# Patient Record
Sex: MALE
[De-identification: names, ages, dates, MRNs, and addresses within clinical notes are randomized per-mention and may not be internally consistent; named-entity substitution may affect disease eponyms.]

---

## 2017-04-19 PROBLEM — Z00.00 ENCOUNTER FOR PREVENTIVE HEALTH EXAMINATION: Status: ACTIVE | Noted: 2017-04-19

## 2017-04-20 ENCOUNTER — APPOINTMENT (OUTPATIENT)
Dept: SURGICAL ONCOLOGY | Facility: CLINIC | Age: 52
End: 2017-04-20

## 2017-04-20 VITALS
HEART RATE: 63 BPM | SYSTOLIC BLOOD PRESSURE: 114 MMHG | HEIGHT: 68 IN | RESPIRATION RATE: 13 BRPM | BODY MASS INDEX: 29.27 KG/M2 | TEMPERATURE: 98.1 F | OXYGEN SATURATION: 97 % | WEIGHT: 193.12 LBS | DIASTOLIC BLOOD PRESSURE: 79 MMHG

## 2017-04-20 DIAGNOSIS — Z97.3 PRESENCE OF SPECTACLES AND CONTACT LENSES: ICD-10-CM

## 2017-04-20 DIAGNOSIS — E78.00 PURE HYPERCHOLESTEROLEMIA, UNSPECIFIED: ICD-10-CM

## 2017-04-20 DIAGNOSIS — Z78.9 OTHER SPECIFIED HEALTH STATUS: ICD-10-CM

## 2017-04-20 DIAGNOSIS — I10 ESSENTIAL (PRIMARY) HYPERTENSION: ICD-10-CM

## 2017-04-20 DIAGNOSIS — Z82.49 FAMILY HISTORY OF ISCHEMIC HEART DISEASE AND OTHER DISEASES OF THE CIRCULATORY SYSTEM: ICD-10-CM

## 2017-04-20 RX ORDER — ATORVASTATIN CALCIUM 20 MG/1
20 TABLET, FILM COATED ORAL
Refills: 0 | Status: ACTIVE | COMMUNITY

## 2017-04-20 RX ORDER — VALACYCLOVIR 1 G/1
1 TABLET, FILM COATED ORAL
Refills: 0 | Status: ACTIVE | COMMUNITY

## 2017-05-09 ENCOUNTER — APPOINTMENT (OUTPATIENT)
Dept: OTOLARYNGOLOGY | Facility: CLINIC | Age: 52
End: 2017-05-09

## 2017-05-09 VITALS — HEART RATE: 72 BPM | SYSTOLIC BLOOD PRESSURE: 143 MMHG | DIASTOLIC BLOOD PRESSURE: 91 MMHG | RESPIRATION RATE: 16 BRPM

## 2017-05-09 DIAGNOSIS — C77.0 SECONDARY AND UNSPECIFIED MALIGNANT NEOPLASM OF LYMPH NODES OF HEAD, FACE AND NECK: ICD-10-CM

## 2017-05-16 ENCOUNTER — OUTPATIENT (OUTPATIENT)
Dept: OUTPATIENT SERVICES | Facility: HOSPITAL | Age: 52
LOS: 1 days | End: 2017-05-16
Payer: COMMERCIAL

## 2017-05-16 VITALS
DIASTOLIC BLOOD PRESSURE: 86 MMHG | WEIGHT: 190.92 LBS | HEIGHT: 68 IN | TEMPERATURE: 97 F | HEART RATE: 64 BPM | SYSTOLIC BLOOD PRESSURE: 130 MMHG | RESPIRATION RATE: 16 BRPM | OXYGEN SATURATION: 97 %

## 2017-05-16 DIAGNOSIS — C01 MALIGNANT NEOPLASM OF BASE OF TONGUE: ICD-10-CM

## 2017-05-16 DIAGNOSIS — G47.33 OBSTRUCTIVE SLEEP APNEA (ADULT) (PEDIATRIC): ICD-10-CM

## 2017-05-16 DIAGNOSIS — Z90.89 ACQUIRED ABSENCE OF OTHER ORGANS: Chronic | ICD-10-CM

## 2017-05-16 DIAGNOSIS — I10 ESSENTIAL (PRIMARY) HYPERTENSION: ICD-10-CM

## 2017-05-16 DIAGNOSIS — Z98.890 OTHER SPECIFIED POSTPROCEDURAL STATES: Chronic | ICD-10-CM

## 2017-05-16 LAB
BUN SERPL-MCNC: 12 MG/DL — SIGNIFICANT CHANGE UP (ref 7–23)
CALCIUM SERPL-MCNC: 9.8 MG/DL — SIGNIFICANT CHANGE UP (ref 8.4–10.5)
CHLORIDE SERPL-SCNC: 100 MMOL/L — SIGNIFICANT CHANGE UP (ref 98–107)
CO2 SERPL-SCNC: 29 MMOL/L — SIGNIFICANT CHANGE UP (ref 22–31)
CREAT SERPL-MCNC: 0.99 MG/DL — SIGNIFICANT CHANGE UP (ref 0.5–1.3)
GLUCOSE SERPL-MCNC: 128 MG/DL — HIGH (ref 70–99)
HCT VFR BLD CALC: 42.4 % — SIGNIFICANT CHANGE UP (ref 39–50)
HGB BLD-MCNC: 14.3 G/DL — SIGNIFICANT CHANGE UP (ref 13–17)
MCHC RBC-ENTMCNC: 29.8 PG — SIGNIFICANT CHANGE UP (ref 27–34)
MCHC RBC-ENTMCNC: 33.7 % — SIGNIFICANT CHANGE UP (ref 32–36)
MCV RBC AUTO: 88.3 FL — SIGNIFICANT CHANGE UP (ref 80–100)
PLATELET # BLD AUTO: 409 K/UL — HIGH (ref 150–400)
PMV BLD: 9.3 FL — SIGNIFICANT CHANGE UP (ref 7–13)
POTASSIUM SERPL-MCNC: 4.1 MMOL/L — SIGNIFICANT CHANGE UP (ref 3.5–5.3)
POTASSIUM SERPL-SCNC: 4.1 MMOL/L — SIGNIFICANT CHANGE UP (ref 3.5–5.3)
RBC # BLD: 4.8 M/UL — SIGNIFICANT CHANGE UP (ref 4.2–5.8)
RBC # FLD: 12.3 % — SIGNIFICANT CHANGE UP (ref 10.3–14.5)
SODIUM SERPL-SCNC: 141 MMOL/L — SIGNIFICANT CHANGE UP (ref 135–145)
WBC # BLD: 9.14 K/UL — SIGNIFICANT CHANGE UP (ref 3.8–10.5)
WBC # FLD AUTO: 9.14 K/UL — SIGNIFICANT CHANGE UP (ref 3.8–10.5)

## 2017-05-16 PROCEDURE — 93010 ELECTROCARDIOGRAM REPORT: CPT

## 2017-05-16 RX ORDER — SODIUM CHLORIDE 9 MG/ML
1000 INJECTION, SOLUTION INTRAVENOUS
Qty: 0 | Refills: 0 | Status: DISCONTINUED | OUTPATIENT
Start: 2017-05-18 | End: 2017-06-02

## 2017-05-16 NOTE — H&P PST ADULT - PROBLEM SELECTOR PLAN 2
EKG done in PST. Instructed to take metoprolol, lisinopril AM of surgery with a sip of water. Patient states, he's evaluated by Dr. Carlos Montero (cardiologist) for clearance. Will obtain for PST chart.

## 2017-05-16 NOTE — H&P PST ADULT - NEGATIVE ENMT SYMPTOMS
no post-nasal discharge/no sinus symptoms/no nasal obstruction/no nasal discharge/no hearing difficulty/no nasal congestion/no vertigo/no tinnitus/no ear pain no nasal obstruction/no ear pain/no tinnitus/no hearing difficulty/no vertigo/no nasal congestion/no post-nasal discharge/no dry mouth/no nasal discharge/no sinus symptoms/no dysphagia

## 2017-05-16 NOTE — H&P PST ADULT - NSANTHOSAYNRD_GEN_A_CORE
No. LEEANNE screening performed.  STOP BANG Legend: 0-2 = LOW Risk; 3-4 = INTERMEDIATE Risk; 5-8 = HIGH Risk

## 2017-05-16 NOTE — H&P PST ADULT - NEGATIVE MUSCULOSKELETAL SYMPTOMS
no muscle cramps/no stiffness/no back pain/no neck pain/no muscle weakness no joint swelling/no back pain/no muscle weakness/no stiffness/no muscle cramps/no neck pain

## 2017-05-16 NOTE — H&P PST ADULT - ENMT COMMENTS
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

## 2017-05-16 NOTE — H&P PST ADULT - RS GEN PE MLT RESP DETAILS PC
airway patent/respirations non-labored/no wheezes/clear to auscultation bilaterally/good air movement/no rhonchi/no rales/breath sounds equal

## 2017-05-16 NOTE — H&P PST ADULT - NEGATIVE OPHTHALMOLOGIC SYMPTOMS
no diplopia/no lacrimation L/no blurred vision R/no blurred vision L/no photophobia/no lacrimation R

## 2017-05-16 NOTE — H&P PST ADULT - HISTORY OF PRESENT ILLNESS
50 yo male with hx HTN, HLD presents to have PST evaluation 52 yo male with hx HTN, HLD presents to have PST evaluation for direct laryngoscopy, biopsy oropharynx, esophagoscopy on 5/18/2017. Patient states, a lump on right neck was noted a month ago, went to walk-in clinic for an evaluation, was sent for US of neck. Patient went for further evaluation, seen by Dr. Weber, had CT scan & biopsy done which revealed, squamous cell carcinoma. Patient states, s/p PET scan which "showed something in tonsil region", was sent to Dr. Donohue, biopsy was recommended. 52 yo male with hx HTN, HLD presents to have PST evaluation for direct laryngoscopy, biopsy oropharynx, esophagoscopy on 5/18/2017. Patient states, a lump on right neck was noted a month ago, went to walk-in clinic for an evaluation, was sent for US of neck. Patient went for further evaluation, seen by Dr. Weber, had CT scan & biopsy done which revealed,"squamous cell carcinoma". Patient states, s/p PET scan which "showed something in tonsil region", was sent to Dr. Donohue, biopsy was recommended.

## 2017-05-18 ENCOUNTER — RESULT REVIEW (OUTPATIENT)
Age: 52
End: 2017-05-18

## 2017-05-18 ENCOUNTER — APPOINTMENT (OUTPATIENT)
Dept: OTOLARYNGOLOGY | Facility: HOSPITAL | Age: 52
End: 2017-05-18

## 2017-05-18 ENCOUNTER — OUTPATIENT (OUTPATIENT)
Dept: OUTPATIENT SERVICES | Facility: HOSPITAL | Age: 52
LOS: 1 days | Discharge: HOME | End: 2017-05-18
Payer: COMMERCIAL

## 2017-05-18 VITALS
HEIGHT: 68 IN | OXYGEN SATURATION: 96 % | TEMPERATURE: 98 F | HEART RATE: 68 BPM | RESPIRATION RATE: 17 BRPM | WEIGHT: 190.92 LBS | SYSTOLIC BLOOD PRESSURE: 117 MMHG | DIASTOLIC BLOOD PRESSURE: 74 MMHG

## 2017-05-18 VITALS
DIASTOLIC BLOOD PRESSURE: 84 MMHG | HEART RATE: 92 BPM | OXYGEN SATURATION: 95 % | SYSTOLIC BLOOD PRESSURE: 116 MMHG | TEMPERATURE: 98 F | RESPIRATION RATE: 13 BRPM

## 2017-05-18 DIAGNOSIS — Z90.89 ACQUIRED ABSENCE OF OTHER ORGANS: Chronic | ICD-10-CM

## 2017-05-18 DIAGNOSIS — C01 MALIGNANT NEOPLASM OF BASE OF TONGUE: ICD-10-CM

## 2017-05-18 DIAGNOSIS — Z98.890 OTHER SPECIFIED POSTPROCEDURAL STATES: Chronic | ICD-10-CM

## 2017-05-18 LAB — NON-GYNECOLOGICAL CYTOLOGY STUDY: SIGNIFICANT CHANGE UP

## 2017-05-18 PROCEDURE — 88305 TISSUE EXAM BY PATHOLOGIST: CPT | Mod: 26,59

## 2017-05-18 PROCEDURE — 88321 CONSLTJ&REPRT SLD PREP ELSWR: CPT

## 2017-05-18 PROCEDURE — 88342 IMHCHEM/IMCYTCHM 1ST ANTB: CPT | Mod: 26,59

## 2017-05-18 PROCEDURE — 88365 INSITU HYBRIDIZATION (FISH): CPT | Mod: 26,59

## 2017-05-18 PROCEDURE — 42800 BIOPSY OF THROAT: CPT | Mod: 59,GC

## 2017-05-18 PROCEDURE — 31525 DX LARYNGOSCOPY EXCL NB: CPT | Mod: GC

## 2017-05-18 PROCEDURE — 88367 INSITU HYBRIDIZATION AUTO: CPT | Mod: 26,59

## 2017-05-18 PROCEDURE — 88331 PATH CONSLTJ SURG 1 BLK 1SPC: CPT | Mod: 26

## 2017-05-18 RX ORDER — ATORVASTATIN CALCIUM 80 MG/1
1 TABLET, FILM COATED ORAL
Qty: 0 | Refills: 0 | COMMUNITY

## 2017-05-18 RX ORDER — DEXAMETHASONE 0.5 MG/5ML
8 ELIXIR ORAL ONCE
Qty: 0 | Refills: 0 | Status: COMPLETED | OUTPATIENT
Start: 2017-05-18 | End: 2017-05-18

## 2017-05-18 RX ORDER — OXYCODONE HYDROCHLORIDE 5 MG/1
1 TABLET ORAL
Qty: 24 | Refills: 0 | OUTPATIENT
Start: 2017-05-18 | End: 2017-05-24

## 2017-05-18 RX ORDER — OXYCODONE HYDROCHLORIDE 5 MG/1
1 TABLET ORAL
Qty: 30 | Refills: 0 | OUTPATIENT
Start: 2017-05-18 | End: 2017-05-24

## 2017-05-18 RX ORDER — FENTANYL CITRATE 50 UG/ML
25 INJECTION INTRAVENOUS
Qty: 0 | Refills: 0 | Status: DISCONTINUED | OUTPATIENT
Start: 2017-05-18 | End: 2017-05-18

## 2017-05-18 RX ORDER — ACETAMINOPHEN 500 MG
650 TABLET ORAL EVERY 6 HOURS
Qty: 0 | Refills: 0 | Status: DISCONTINUED | OUTPATIENT
Start: 2017-05-18 | End: 2017-06-02

## 2017-05-18 RX ORDER — SODIUM CHLORIDE 9 MG/ML
1000 INJECTION, SOLUTION INTRAVENOUS
Qty: 0 | Refills: 0 | Status: DISCONTINUED | OUTPATIENT
Start: 2017-05-18 | End: 2017-06-02

## 2017-05-18 RX ORDER — ACETAMINOPHEN 500 MG
2 TABLET ORAL
Qty: 0 | Refills: 0 | COMMUNITY
Start: 2017-05-18

## 2017-05-18 RX ORDER — ONDANSETRON 8 MG/1
4 TABLET, FILM COATED ORAL ONCE
Qty: 0 | Refills: 0 | Status: DISCONTINUED | OUTPATIENT
Start: 2017-05-18 | End: 2017-05-18

## 2017-05-18 RX ORDER — LISINOPRIL 2.5 MG/1
1 TABLET ORAL
Qty: 0 | Refills: 0 | COMMUNITY

## 2017-05-18 RX ORDER — METOPROLOL TARTRATE 50 MG
1 TABLET ORAL
Qty: 0 | Refills: 0 | COMMUNITY

## 2017-05-18 RX ORDER — DEXAMETHASONE 0.5 MG/5ML
12 ELIXIR ORAL ONCE
Qty: 0 | Refills: 0 | Status: DISCONTINUED | OUTPATIENT
Start: 2017-05-18 | End: 2017-05-18

## 2017-05-18 RX ADMIN — FENTANYL CITRATE 25 MICROGRAM(S): 50 INJECTION INTRAVENOUS at 21:15

## 2017-05-18 RX ADMIN — SODIUM CHLORIDE 30 MILLILITER(S): 9 INJECTION, SOLUTION INTRAVENOUS at 13:32

## 2017-05-18 RX ADMIN — Medication 101.6 MILLIGRAM(S): at 18:53

## 2017-05-18 RX ADMIN — FENTANYL CITRATE 25 MICROGRAM(S): 50 INJECTION INTRAVENOUS at 20:40

## 2017-05-18 NOTE — ASU DISCHARGE PLAN (ADULT/PEDIATRIC). - MEDICATION SUMMARY - MEDICATIONS TO TAKE
I will START or STAY ON the medications listed below when I get home from the hospital:    acetaminophen 325 mg oral tablet  -- 2 tab(s) by mouth every 6 hours, As needed, Mild Pain (1 - 3)  -- Indication: For pain    acetaminophen-oxycodone 325 mg-5 mg oral tablet  -- 1 tab(s) by mouth every 6 hours prn severe pain MDD:4 tab  -- Caution federal law prohibits the transfer of this drug to any person other  than the person for whom it was prescribed.  May cause drowsiness.  Alcohol may intensify this effect.  Use care when operating dangerous machinery.  This prescription cannot be refilled.  This product contains acetaminophen.  Do not use  with any other product containing acetaminophen to prevent possible liver damage.  Using more of this medication than prescribed may cause serious breathing problems.    -- Indication: For pain    lisinopril 5 mg oral tablet  -- 1 tab(s) by mouth once a day  -- Indication: For home    atorvastatin 20 mg oral tablet  -- 1 tab(s) by mouth once a day  -- Indication: For home    metoprolol succinate 50 mg oral tablet, extended release  -- 1 tab(s) by mouth once a day  -- Indication: For home

## 2017-05-19 ENCOUNTER — TRANSCRIPTION ENCOUNTER (OUTPATIENT)
Age: 52
End: 2017-05-19

## 2017-05-23 LAB — SURGICAL PATHOLOGY STUDY: SIGNIFICANT CHANGE UP

## 2017-05-26 ENCOUNTER — APPOINTMENT (OUTPATIENT)
Dept: OTOLARYNGOLOGY | Facility: CLINIC | Age: 52
End: 2017-05-26

## 2017-05-26 VITALS
HEIGHT: 68 IN | DIASTOLIC BLOOD PRESSURE: 94 MMHG | HEART RATE: 85 BPM | SYSTOLIC BLOOD PRESSURE: 146 MMHG | RESPIRATION RATE: 16 BRPM | BODY MASS INDEX: 28.79 KG/M2 | WEIGHT: 190 LBS

## 2017-05-26 DIAGNOSIS — R22.1 LOCALIZED SWELLING, MASS AND LUMP, NECK: ICD-10-CM

## 2017-05-30 ENCOUNTER — RESULT REVIEW (OUTPATIENT)
Age: 52
End: 2017-05-30

## 2017-07-21 ENCOUNTER — APPOINTMENT (OUTPATIENT)
Dept: OTOLARYNGOLOGY | Facility: CLINIC | Age: 52
End: 2017-07-21

## 2017-07-21 VITALS
DIASTOLIC BLOOD PRESSURE: 80 MMHG | HEIGHT: 68 IN | WEIGHT: 178 LBS | BODY MASS INDEX: 26.98 KG/M2 | HEART RATE: 69 BPM | SYSTOLIC BLOOD PRESSURE: 109 MMHG

## 2017-07-21 DIAGNOSIS — Z09 ENCOUNTER FOR FOLLOW-UP EXAMINATION AFTER COMPLETED TREATMENT FOR CONDITIONS OTHER THAN MALIGNANT NEOPLASM: ICD-10-CM

## 2017-10-06 ENCOUNTER — APPOINTMENT (OUTPATIENT)
Dept: OTOLARYNGOLOGY | Facility: CLINIC | Age: 52
End: 2017-10-06
Payer: COMMERCIAL

## 2017-10-06 VITALS
BODY MASS INDEX: 23.19 KG/M2 | HEIGHT: 68 IN | WEIGHT: 153 LBS | DIASTOLIC BLOOD PRESSURE: 88 MMHG | SYSTOLIC BLOOD PRESSURE: 118 MMHG

## 2017-10-06 PROCEDURE — 99214 OFFICE O/P EST MOD 30 MIN: CPT | Mod: 25

## 2017-10-06 PROCEDURE — 31575 DIAGNOSTIC LARYNGOSCOPY: CPT

## 2018-01-16 ENCOUNTER — APPOINTMENT (OUTPATIENT)
Dept: OTOLARYNGOLOGY | Facility: CLINIC | Age: 53
End: 2018-01-16

## 2018-02-13 ENCOUNTER — APPOINTMENT (OUTPATIENT)
Dept: OTOLARYNGOLOGY | Facility: CLINIC | Age: 53
End: 2018-02-13
Payer: COMMERCIAL

## 2018-02-13 VITALS
WEIGHT: 147 LBS | BODY MASS INDEX: 22.28 KG/M2 | HEART RATE: 72 BPM | HEIGHT: 68 IN | RESPIRATION RATE: 16 BRPM | SYSTOLIC BLOOD PRESSURE: 137 MMHG | DIASTOLIC BLOOD PRESSURE: 87 MMHG

## 2018-02-13 PROCEDURE — 99214 OFFICE O/P EST MOD 30 MIN: CPT | Mod: 25

## 2018-02-13 PROCEDURE — 31575 DIAGNOSTIC LARYNGOSCOPY: CPT

## 2018-02-13 RX ORDER — SILVER SULFADIAZINE 10 G/1000G
1 CREAM TOPICAL
Qty: 50 | Refills: 0 | Status: ACTIVE | COMMUNITY
Start: 2017-07-07

## 2018-05-29 ENCOUNTER — APPOINTMENT (OUTPATIENT)
Dept: OTOLARYNGOLOGY | Facility: CLINIC | Age: 53
End: 2018-05-29
Payer: COMMERCIAL

## 2018-05-29 VITALS
SYSTOLIC BLOOD PRESSURE: 134 MMHG | BODY MASS INDEX: 25.01 KG/M2 | HEIGHT: 68 IN | DIASTOLIC BLOOD PRESSURE: 90 MMHG | WEIGHT: 165 LBS | RESPIRATION RATE: 16 BRPM | HEART RATE: 74 BPM

## 2018-05-29 PROCEDURE — 31575 DIAGNOSTIC LARYNGOSCOPY: CPT

## 2018-05-29 PROCEDURE — 99214 OFFICE O/P EST MOD 30 MIN: CPT | Mod: 25

## 2018-09-14 ENCOUNTER — APPOINTMENT (OUTPATIENT)
Dept: OTOLARYNGOLOGY | Facility: CLINIC | Age: 53
End: 2018-09-14
Payer: COMMERCIAL

## 2018-09-14 VITALS
SYSTOLIC BLOOD PRESSURE: 114 MMHG | BODY MASS INDEX: 25.31 KG/M2 | RESPIRATION RATE: 16 BRPM | DIASTOLIC BLOOD PRESSURE: 85 MMHG | OXYGEN SATURATION: 98 % | HEIGHT: 68 IN | WEIGHT: 167 LBS | HEART RATE: 94 BPM

## 2018-09-14 PROBLEM — I10 ESSENTIAL (PRIMARY) HYPERTENSION: Chronic | Status: ACTIVE | Noted: 2017-05-16

## 2018-09-14 PROBLEM — E78.5 HYPERLIPIDEMIA, UNSPECIFIED: Chronic | Status: ACTIVE | Noted: 2017-05-16

## 2018-09-14 PROCEDURE — 31575 DIAGNOSTIC LARYNGOSCOPY: CPT

## 2018-09-14 PROCEDURE — 99214 OFFICE O/P EST MOD 30 MIN: CPT | Mod: 25

## 2018-12-14 ENCOUNTER — APPOINTMENT (OUTPATIENT)
Dept: OTOLARYNGOLOGY | Facility: CLINIC | Age: 53
End: 2018-12-14
Payer: COMMERCIAL

## 2018-12-14 VITALS
SYSTOLIC BLOOD PRESSURE: 142 MMHG | OXYGEN SATURATION: 100 % | HEART RATE: 91 BPM | HEIGHT: 68 IN | DIASTOLIC BLOOD PRESSURE: 92 MMHG | BODY MASS INDEX: 25.76 KG/M2 | RESPIRATION RATE: 16 BRPM | WEIGHT: 170 LBS

## 2018-12-14 DIAGNOSIS — R13.12 DYSPHAGIA, OROPHARYNGEAL PHASE: ICD-10-CM

## 2018-12-14 PROCEDURE — 31575 DIAGNOSTIC LARYNGOSCOPY: CPT

## 2018-12-14 PROCEDURE — 99214 OFFICE O/P EST MOD 30 MIN: CPT | Mod: 25

## 2018-12-14 RX ORDER — LEVOFLOXACIN 500 MG/1
500 TABLET, FILM COATED ORAL
Qty: 7 | Refills: 0 | Status: DISCONTINUED | COMMUNITY
Start: 2017-06-13 | End: 2018-12-14

## 2018-12-14 RX ORDER — MORPHINE SULFATE 100 MG/5ML
100 SOLUTION ORAL
Qty: 120 | Refills: 0 | Status: DISCONTINUED | COMMUNITY
Start: 2017-08-05 | End: 2018-12-14

## 2018-12-14 RX ORDER — METOPROLOL SUCCINATE 50 MG/1
50 TABLET, EXTENDED RELEASE ORAL
Refills: 0 | Status: DISCONTINUED | COMMUNITY
End: 2018-12-14

## 2018-12-14 RX ORDER — FLUCONAZOLE 40 MG/ML
40 POWDER, FOR SUSPENSION ORAL
Qty: 70 | Refills: 0 | Status: DISCONTINUED | COMMUNITY
Start: 2017-09-26 | End: 2018-12-14

## 2018-12-14 RX ORDER — ONDANSETRON 8 MG/1
8 TABLET, ORALLY DISINTEGRATING ORAL
Qty: 30 | Refills: 0 | Status: DISCONTINUED | COMMUNITY
Start: 2017-05-31 | End: 2018-12-14

## 2018-12-14 RX ORDER — CHLORHEXIDINE GLUCONATE, 0.12% ORAL RINSE 1.2 MG/ML
0.12 SOLUTION DENTAL
Qty: 473 | Refills: 0 | Status: DISCONTINUED | COMMUNITY
Start: 2017-06-05 | End: 2018-12-14

## 2018-12-14 RX ORDER — HYDROCODONE BITARTRATE AND ACETAMINOPHEN 5; 300 MG/1; MG/1
5-300 TABLET ORAL
Qty: 20 | Refills: 0 | Status: DISCONTINUED | COMMUNITY
Start: 2017-06-05 | End: 2018-12-14

## 2018-12-14 RX ORDER — LEVOTHYROXINE SODIUM 25 UG/1
25 TABLET ORAL
Refills: 0 | Status: ACTIVE | COMMUNITY

## 2018-12-14 RX ORDER — OXYCODONE AND ACETAMINOPHEN 5; 325 MG/1; MG/1
5-325 TABLET ORAL
Qty: 30 | Refills: 0 | Status: DISCONTINUED | COMMUNITY
Start: 2017-05-18 | End: 2018-12-14

## 2018-12-14 RX ORDER — SUCRALFATE 1 G/10ML
1 SUSPENSION ORAL
Qty: 120 | Refills: 0 | Status: DISCONTINUED | COMMUNITY
Start: 2017-08-05 | End: 2018-12-14

## 2018-12-14 RX ORDER — NYSTATIN 100000 [USP'U]/ML
100000 SUSPENSION ORAL
Qty: 280 | Refills: 0 | Status: DISCONTINUED | COMMUNITY
Start: 2017-08-01 | End: 2018-12-14

## 2018-12-14 RX ORDER — LISINOPRIL 5 MG/1
5 TABLET ORAL
Refills: 0 | Status: DISCONTINUED | COMMUNITY
End: 2018-12-14

## 2019-01-23 ENCOUNTER — FORM ENCOUNTER (OUTPATIENT)
Age: 54
End: 2019-01-23

## 2019-01-24 ENCOUNTER — OUTPATIENT (OUTPATIENT)
Dept: OUTPATIENT SERVICES | Facility: HOSPITAL | Age: 54
LOS: 1 days | End: 2019-01-24
Payer: COMMERCIAL

## 2019-01-24 ENCOUNTER — APPOINTMENT (OUTPATIENT)
Dept: CT IMAGING | Facility: CLINIC | Age: 54
End: 2019-01-24
Payer: COMMERCIAL

## 2019-01-24 DIAGNOSIS — C01 MALIGNANT NEOPLASM OF BASE OF TONGUE: ICD-10-CM

## 2019-01-24 DIAGNOSIS — Z90.89 ACQUIRED ABSENCE OF OTHER ORGANS: Chronic | ICD-10-CM

## 2019-01-24 DIAGNOSIS — Z98.890 OTHER SPECIFIED POSTPROCEDURAL STATES: Chronic | ICD-10-CM

## 2019-01-24 PROCEDURE — 70491 CT SOFT TISSUE NECK W/DYE: CPT

## 2019-01-24 PROCEDURE — 70491 CT SOFT TISSUE NECK W/DYE: CPT | Mod: 26

## 2019-01-28 ENCOUNTER — RESULT REVIEW (OUTPATIENT)
Age: 54
End: 2019-01-28

## 2019-06-17 ENCOUNTER — RX RENEWAL (OUTPATIENT)
Age: 54
End: 2019-06-17

## 2019-06-17 RX ORDER — SODIUM FLUORIDE 1.1 G/100G
1.1 GEL, DENTIFRICE ORAL
Qty: 3 | Refills: 11 | Status: ACTIVE | COMMUNITY
Start: 2017-10-06 | End: 1900-01-01

## 2019-09-02 PROBLEM — Z09 FOLLOW UP: Status: ACTIVE | Noted: 2017-07-21

## 2019-10-22 ENCOUNTER — APPOINTMENT (OUTPATIENT)
Dept: OTOLARYNGOLOGY | Facility: CLINIC | Age: 54
End: 2019-10-22
Payer: COMMERCIAL

## 2019-10-22 VITALS
HEART RATE: 71 BPM | SYSTOLIC BLOOD PRESSURE: 145 MMHG | WEIGHT: 184 LBS | HEIGHT: 68 IN | DIASTOLIC BLOOD PRESSURE: 85 MMHG | BODY MASS INDEX: 27.89 KG/M2

## 2019-10-22 PROCEDURE — 99214 OFFICE O/P EST MOD 30 MIN: CPT | Mod: 25

## 2019-10-22 PROCEDURE — 31575 DIAGNOSTIC LARYNGOSCOPY: CPT

## 2019-10-22 NOTE — CONSULT LETTER
[Dear  ___] : Dear  [unfilled], [Sincerely,] : Sincerely, [Please see my note below.] : Please see my note below. [Courtesy Letter:] : I had the pleasure of seeing your patient, [unfilled], in my office today. [FreeTextEntry2] : Aleida Lama MD ([address] [place], [place]) [FreeTextEntry3] : Filemon Donohue MD

## 2019-10-22 NOTE — REASON FOR VISIT
[Subsequent Evaluation] : a subsequent evaluation for [FreeTextEntry2] : SCCA BOT s/p chemo/RT 8/2017

## 2019-10-22 NOTE — PROCEDURE
[Flexible Endoscope] : examined with the flexible endoscope [Oxymetazoline HCl] : oxymetazoline HCl [Topical Lidocaine] : topical lidocaine [Serial Number: ___] : Serial Number: [unfilled] [Normal] : normal base of the tongue [de-identified] : aleyda CABRAL [de-identified] : stephanie scca BOT

## 2019-10-22 NOTE — HISTORY OF PRESENT ILLNESS
[de-identified] : SCCA BOT s/p chemo/RT 8/2017. Pt using prevident daily. Pt denies pain or discomfort. denies dysphagia. Pt to see  med onc 11/1. Pt has not seen  follows up every 6 months.   Pt is 2 years 2 mo out. food tastes OK.   Pt under endo care as well

## 2019-12-21 NOTE — H&P PST ADULT - ASSESSMENT
no
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

## 2020-03-24 ENCOUNTER — RX RENEWAL (OUTPATIENT)
Age: 55
End: 2020-03-24

## 2020-05-20 NOTE — H&P PST ADULT - NEGATIVE CARDIOVASCULAR SYMPTOMS
no dyspnea on exertion/no chest pain/no palpitations/no peripheral edema Doxepin Pregnancy And Lactation Text: This medication is Pregnancy Category C and it isn't known if it is safe during pregnancy. It is also excreted in breast milk and breast feeding isn't recommended.

## 2020-06-09 ENCOUNTER — APPOINTMENT (OUTPATIENT)
Dept: OTOLARYNGOLOGY | Facility: CLINIC | Age: 55
End: 2020-06-09
Payer: COMMERCIAL

## 2020-06-09 VITALS
BODY MASS INDEX: 27.28 KG/M2 | WEIGHT: 180 LBS | HEART RATE: 74 BPM | DIASTOLIC BLOOD PRESSURE: 72 MMHG | HEIGHT: 68 IN | RESPIRATION RATE: 16 BRPM | SYSTOLIC BLOOD PRESSURE: 108 MMHG

## 2020-06-09 DIAGNOSIS — J31.2 CHRONIC PHARYNGITIS: ICD-10-CM

## 2020-06-09 PROCEDURE — 99214 OFFICE O/P EST MOD 30 MIN: CPT | Mod: 25

## 2020-06-09 PROCEDURE — 31575 DIAGNOSTIC LARYNGOSCOPY: CPT

## 2020-06-09 NOTE — PROCEDURE
[None] : none [Flexible Endoscope] : examined with the flexible endoscope [de-identified] : aleyda CABRAL [Normal] : normal [de-identified] : stephanie scca BOT

## 2020-06-09 NOTE — CONSULT LETTER
[Dear  ___] : Dear  [unfilled], [Please see my note below.] : Please see my note below. [Courtesy Letter:] : I had the pleasure of seeing your patient, [unfilled], in my office today. [Sincerely,] : Sincerely, [FreeTextEntry2] : Aleida Lama MD ([facility] [place]) [FreeTextEntry3] : Filemon Donohue MD

## 2020-06-09 NOTE — HISTORY OF PRESENT ILLNESS
[None] : The patient is currently asymptomatic. [de-identified] : Mr. Dale is a 55 yo male who is here for follow up with history of SCCA BOT s/p chemo/RT 8/2017. Pt using prevident daily and follows with dentist regularly. Pt denies pain or discomfort. Pt to see  med onc in the next few days . Pt has not seen  follows up every 6 months.   Pt is 2 years 10 mo out.\par  Denies dysphagia, dyspnea, dysphonia.    \par Pt under endo care as well\par Ct scan neck done May 11/2020 that showed a right vertebral artery stenosis, no treatment,follow by Dr. Mohsen Bannazadeh ( vascular)\par Denies recent infections, fever, cough or chills.

## 2020-09-24 ENCOUNTER — RX RENEWAL (OUTPATIENT)
Age: 55
End: 2020-09-24

## 2020-09-24 RX ORDER — 1.1% SODIUM FLUORIDE PRESCRIPTION DENTAL CREAM 5 MG/G
1.1 CREAM DENTAL
Qty: 153 | Refills: 6 | Status: ACTIVE | COMMUNITY
Start: 2020-03-24 | End: 1900-01-01

## 2020-10-13 ENCOUNTER — APPOINTMENT (OUTPATIENT)
Dept: OTOLARYNGOLOGY | Facility: CLINIC | Age: 55
End: 2020-10-13
Payer: COMMERCIAL

## 2020-10-13 VITALS
HEART RATE: 75 BPM | BODY MASS INDEX: 27.74 KG/M2 | HEIGHT: 68 IN | WEIGHT: 183 LBS | DIASTOLIC BLOOD PRESSURE: 76 MMHG | SYSTOLIC BLOOD PRESSURE: 112 MMHG

## 2020-10-13 PROCEDURE — 31575 DIAGNOSTIC LARYNGOSCOPY: CPT

## 2020-10-13 PROCEDURE — 99214 OFFICE O/P EST MOD 30 MIN: CPT | Mod: 25

## 2020-10-13 NOTE — PHYSICAL EXAM
04/15/19      Geovanna Reinoso   [facility] [place], [place]  [place] [address]        Dear Geovanna,    Our records indicate that you are due for a follow up visit and blood work.  Bariatric surgery is a life changing surgery, for your safety and continued health it is imperative you follow up regularly. This commitment was discussed throughout the preoperative process and at your preoperative consult with the surgeon.  At this time we want to remind you to follow up with us to continue the journey to a healthy weight.  Please call [phone] to schedule your appointment with the bariatric clinic.    Vitamin and mineral deficiencies can be serious and some symptoms irreversible if not discovered and treated.  Please have labs drawn at least 7 days before your scheduled follow up.  Remember do not eat or drink anything including your vitamins for several hours before your blood draw. We will discuss the results of the blood work at your appointment.     Your health is important to us.  Please contact us at any time if you have concerns about your health or begin to experience weight gain.  We are committed to working with you to reach and maintain your weight loss goals and continued health.     We look forward to hearing from you soon!      Sincerely,    [facility] Bariatric Team  [phone]   [Midline] : trachea located in midline position [Normal] : no rashes

## 2020-10-13 NOTE — CONSULT LETTER
[Dear  ___] : Dear  [unfilled], [Courtesy Letter:] : I had the pleasure of seeing your patient, [unfilled], in my office today. [Please see my note below.] : Please see my note below. [Sincerely,] : Sincerely, [FreeTextEntry2] : Aleida Lama MD  ([place], [place]) [FreeTextEntry3] : Filemon Rodriguez MD [DrJanine  ___] : Dr. TOVAR

## 2020-10-13 NOTE — HISTORY OF PRESENT ILLNESS
[None] : No associated symptoms are reported. [de-identified] : Mr. Dale is a 55 yo male who is here for follow up with history of SCCA BOT s/p chemo/RT 8/2017. Pt using prevident daily and follows with dentist regularly. Pt denies pain or discomfort. Pt recentlly saw   med onc. Pt has not seen  .   Pt is 3  years 2 mo out.\par  Denies dysphagia, dyspnea, dysphonia.  Pt . and Dr. Lama noticed a decreased in weight of about 6 lbs in 2 months , he reports skipping thyroid medication for 1 month, and has been working long hours. \par Pt is under the care of (Endocrinologist) and following up on his levothyroxine med. \par Ct scan neck done May 11/2020 that showed a right vertebral artery stenosis, no treatment,follow by Dr. Mohsen Bannazadeh ( vascular)\par Denies recent infections, fever, cough or chills.

## 2021-02-09 ENCOUNTER — APPOINTMENT (OUTPATIENT)
Dept: OTOLARYNGOLOGY | Facility: CLINIC | Age: 56
End: 2021-02-09

## 2021-07-06 ENCOUNTER — NON-APPOINTMENT (OUTPATIENT)
Age: 56
End: 2021-07-06

## 2021-10-14 ENCOUNTER — RX RENEWAL (OUTPATIENT)
Age: 56
End: 2021-10-14

## 2021-12-03 ENCOUNTER — TRANSCRIPTION ENCOUNTER (OUTPATIENT)
Age: 56
End: 2021-12-03

## 2022-08-12 ENCOUNTER — NON-APPOINTMENT (OUTPATIENT)
Age: 57
End: 2022-08-12

## 2022-11-09 ENCOUNTER — RX RENEWAL (OUTPATIENT)
Age: 57
End: 2022-11-09

## 2022-11-30 ENCOUNTER — OFFICE (OUTPATIENT)
Dept: URBAN - METROPOLITAN AREA CLINIC 105 | Facility: CLINIC | Age: 57
Setting detail: OPHTHALMOLOGY
End: 2022-11-30
Payer: COMMERCIAL

## 2022-11-30 DIAGNOSIS — H25.13: ICD-10-CM

## 2022-11-30 DIAGNOSIS — H43.812: ICD-10-CM

## 2022-11-30 DIAGNOSIS — H43.392: ICD-10-CM

## 2022-11-30 PROCEDURE — 92004 COMPRE OPH EXAM NEW PT 1/>: CPT | Performed by: OPHTHALMOLOGY

## 2022-11-30 ASSESSMENT — REFRACTION_AUTOREFRACTION
OD_AXIS: 003
OS_CYLINDER: -0.50
OD_CYLINDER: -0.75
OD_SPHERE: +0.25
OS_SPHERE: -0.75
OS_AXIS: 021

## 2022-11-30 ASSESSMENT — REFRACTION_MANIFEST
OS_VA1: 20/20
OS_SPHERE: -1.00

## 2022-11-30 ASSESSMENT — CONFRONTATIONAL VISUAL FIELD TEST (CVF)
OS_FINDINGS: FULL
OD_FINDINGS: FULL

## 2022-11-30 ASSESSMENT — TONOMETRY
OD_IOP_MMHG: 12
OS_IOP_MMHG: 13

## 2022-11-30 ASSESSMENT — VISUAL ACUITY
OD_BCVA: 20/40-1
OS_BCVA: 20/20

## 2022-11-30 ASSESSMENT — SPHEQUIV_DERIVED
OD_SPHEQUIV: -0.125
OS_SPHEQUIV: -1

## 2022-12-05 ENCOUNTER — OFFICE (OUTPATIENT)
Dept: URBAN - METROPOLITAN AREA CLINIC 115 | Facility: CLINIC | Age: 57
Setting detail: OPHTHALMOLOGY
End: 2022-12-05
Payer: COMMERCIAL

## 2022-12-05 DIAGNOSIS — H43.812: ICD-10-CM

## 2022-12-05 DIAGNOSIS — H43.392: ICD-10-CM

## 2022-12-05 PROBLEM — H52.7 REFRACTIVE ERROR: Status: ACTIVE | Noted: 2022-11-30

## 2022-12-05 PROBLEM — H25.13 CATARACT SENILE NUCLEAR SCLEROSIS; BOTH EYES: Status: ACTIVE | Noted: 2022-11-30

## 2022-12-05 PROCEDURE — 99214 OFFICE O/P EST MOD 30 MIN: CPT | Performed by: OPHTHALMOLOGY

## 2022-12-05 PROCEDURE — 92134 CPTRZ OPH DX IMG PST SGM RTA: CPT | Performed by: OPHTHALMOLOGY

## 2022-12-05 ASSESSMENT — AXIALLENGTH_DERIVED
OS_AL: 23.6603
OD_AL: 23.2775

## 2022-12-05 ASSESSMENT — SPHEQUIV_DERIVED
OS_SPHEQUIV: -1
OD_SPHEQUIV: -0.125

## 2022-12-05 ASSESSMENT — KERATOMETRY
OD_K2POWER_DIOPTERS: 45.00
OS_AXISANGLE_DEGREES: 092
OS_K1POWER_DIOPTERS: 44.25
OD_AXISANGLE_DEGREES: 094
OS_K2POWER_DIOPTERS: 44.50
OD_K1POWER_DIOPTERS: 44.00

## 2022-12-05 ASSESSMENT — REFRACTION_AUTOREFRACTION
OS_CYLINDER: -0.50
OS_AXIS: 021
OS_SPHERE: -0.75
OD_SPHERE: +0.25
OD_CYLINDER: -0.75
OD_AXIS: 003

## 2022-12-05 ASSESSMENT — VISUAL ACUITY
OS_BCVA: 20/20
OD_BCVA: 20/30

## 2022-12-05 ASSESSMENT — TONOMETRY
OS_IOP_MMHG: 17
OD_IOP_MMHG: 17

## 2023-01-18 ENCOUNTER — OFFICE (OUTPATIENT)
Dept: URBAN - METROPOLITAN AREA CLINIC 105 | Facility: CLINIC | Age: 58
Setting detail: OPHTHALMOLOGY
End: 2023-01-18
Payer: COMMERCIAL

## 2023-01-18 DIAGNOSIS — H43.813: ICD-10-CM

## 2023-01-18 DIAGNOSIS — H43.391: ICD-10-CM

## 2023-01-18 DIAGNOSIS — H43.812: ICD-10-CM

## 2023-01-18 DIAGNOSIS — H43.393: ICD-10-CM

## 2023-01-18 DIAGNOSIS — H43.392: ICD-10-CM

## 2023-01-18 DIAGNOSIS — H43.811: ICD-10-CM

## 2023-01-18 PROCEDURE — 92014 COMPRE OPH EXAM EST PT 1/>: CPT | Performed by: OPHTHALMOLOGY

## 2023-01-18 PROCEDURE — 92134 CPTRZ OPH DX IMG PST SGM RTA: CPT | Performed by: OPHTHALMOLOGY

## 2023-01-18 ASSESSMENT — VISUAL ACUITY
OS_BCVA: 20/20
OD_BCVA: 20/40-1

## 2023-01-18 ASSESSMENT — SPHEQUIV_DERIVED
OS_SPHEQUIV: -1
OD_SPHEQUIV: -0.125

## 2023-01-18 ASSESSMENT — REFRACTION_AUTOREFRACTION
OS_AXIS: 021
OD_AXIS: 003
OD_SPHERE: +0.25
OS_SPHERE: -0.75
OS_CYLINDER: -0.50
OD_CYLINDER: -0.75

## 2023-01-18 ASSESSMENT — CONFRONTATIONAL VISUAL FIELD TEST (CVF)
OS_FINDINGS: FULL
OD_FINDINGS: FULL

## 2023-01-18 ASSESSMENT — KERATOMETRY
OS_K2POWER_DIOPTERS: 44.50
OD_AXISANGLE_DEGREES: 094
OD_K1POWER_DIOPTERS: 44.00
OS_K1POWER_DIOPTERS: 44.25
OD_K2POWER_DIOPTERS: 45.00
OS_AXISANGLE_DEGREES: 092

## 2023-01-18 ASSESSMENT — AXIALLENGTH_DERIVED
OS_AL: 23.6603
OD_AL: 23.2775

## 2023-02-26 ENCOUNTER — NON-APPOINTMENT (OUTPATIENT)
Age: 58
End: 2023-02-26

## 2023-04-12 NOTE — REASON FOR VISIT
1735 Ob RN at bedside to assess fetal heart tones. Normal range of 140's. Pt is in excruciating pain. Pt had 3 episodes of bradycardia down to 49bpm in pacu at 1739. Dr Barrett at bedside to assess immediately and ordered EKG and multiple labs. OB RN at bedside to reassess and fetal heart doplar tones remain in 140's. 1816 Pt has had no additional episodes of bradycardia.    [Subsequent Evaluation] : a subsequent evaluation for [FreeTextEntry2] : SCCA BOT s/p chemo/RT 8/2017

## 2023-04-14 ENCOUNTER — OFFICE (OUTPATIENT)
Dept: URBAN - METROPOLITAN AREA CLINIC 105 | Facility: CLINIC | Age: 58
Setting detail: OPHTHALMOLOGY
End: 2023-04-14
Payer: COMMERCIAL

## 2023-04-14 DIAGNOSIS — H43.811: ICD-10-CM

## 2023-04-14 DIAGNOSIS — H43.813: ICD-10-CM

## 2023-04-14 DIAGNOSIS — H43.812: ICD-10-CM

## 2023-04-14 DIAGNOSIS — H43.392: ICD-10-CM

## 2023-04-14 DIAGNOSIS — H43.393: ICD-10-CM

## 2023-04-14 DIAGNOSIS — H43.391: ICD-10-CM

## 2023-04-14 PROCEDURE — 92134 CPTRZ OPH DX IMG PST SGM RTA: CPT | Performed by: OPHTHALMOLOGY

## 2023-04-14 PROCEDURE — 92012 INTRM OPH EXAM EST PATIENT: CPT | Performed by: OPHTHALMOLOGY

## 2023-04-14 ASSESSMENT — REFRACTION_AUTOREFRACTION
OD_SPHERE: +0.25
OS_SPHERE: -0.75
OS_AXIS: 021
OD_CYLINDER: -0.75
OS_CYLINDER: -0.50
OD_AXIS: 003

## 2023-04-14 ASSESSMENT — CONFRONTATIONAL VISUAL FIELD TEST (CVF)
OS_FINDINGS: FULL
OD_FINDINGS: FULL

## 2023-04-14 ASSESSMENT — SPHEQUIV_DERIVED
OD_SPHEQUIV: -0.125
OS_SPHEQUIV: -1

## 2023-04-14 ASSESSMENT — KERATOMETRY
OD_K1POWER_DIOPTERS: 44.00
OD_K2POWER_DIOPTERS: 45.00
OS_K2POWER_DIOPTERS: 44.50
OS_AXISANGLE_DEGREES: 092
OD_AXISANGLE_DEGREES: 094
OS_K1POWER_DIOPTERS: 44.25

## 2023-04-14 ASSESSMENT — AXIALLENGTH_DERIVED
OS_AL: 23.6603
OD_AL: 23.2775

## 2023-04-14 ASSESSMENT — VISUAL ACUITY
OS_BCVA: 20/20
OD_BCVA: 20/40-

## 2023-04-28 ENCOUNTER — ASC (OUTPATIENT)
Dept: URBAN - METROPOLITAN AREA SURGERY 8 | Facility: SURGERY | Age: 58
Setting detail: OPHTHALMOLOGY
End: 2023-04-28
Payer: COMMERCIAL

## 2023-04-28 DIAGNOSIS — H43.812: ICD-10-CM

## 2023-04-28 PROCEDURE — 67036 REMOVAL OF INNER EYE FLUID: CPT | Performed by: OPHTHALMOLOGY

## 2023-05-03 ENCOUNTER — RX ONLY (RX ONLY)
Age: 58
End: 2023-05-03

## 2023-05-03 ENCOUNTER — OFFICE (OUTPATIENT)
Dept: URBAN - METROPOLITAN AREA CLINIC 94 | Facility: CLINIC | Age: 58
Setting detail: OPHTHALMOLOGY
End: 2023-05-03
Payer: COMMERCIAL

## 2023-05-03 DIAGNOSIS — H43.812: ICD-10-CM

## 2023-05-03 PROCEDURE — 99024 POSTOP FOLLOW-UP VISIT: CPT | Performed by: OPHTHALMOLOGY

## 2023-05-03 ASSESSMENT — VISUAL ACUITY
OD_BCVA: 20/15
OS_BCVA: 20/15

## 2023-05-03 ASSESSMENT — AXIALLENGTH_DERIVED
OS_AL: 23.6603
OD_AL: 23.2775

## 2023-05-03 ASSESSMENT — KERATOMETRY
OS_AXISANGLE_DEGREES: 092
OD_K1POWER_DIOPTERS: 44.00
OS_K2POWER_DIOPTERS: 44.50
OD_K2POWER_DIOPTERS: 45.00
OS_K1POWER_DIOPTERS: 44.25
OD_AXISANGLE_DEGREES: 094

## 2023-05-03 ASSESSMENT — SPHEQUIV_DERIVED
OS_SPHEQUIV: -1
OD_SPHEQUIV: -0.125

## 2023-05-03 ASSESSMENT — TONOMETRY
OS_IOP_MMHG: 15
OD_IOP_MMHG: 15

## 2023-05-03 ASSESSMENT — REFRACTION_AUTOREFRACTION
OD_AXIS: 003
OS_AXIS: 021
OD_CYLINDER: -0.75
OS_CYLINDER: -0.50
OS_SPHERE: -0.75
OD_SPHERE: +0.25

## 2023-05-03 ASSESSMENT — CONFRONTATIONAL VISUAL FIELD TEST (CVF)
OS_FINDINGS: FULL
OD_FINDINGS: FULL

## 2023-05-24 ENCOUNTER — OFFICE (OUTPATIENT)
Dept: URBAN - METROPOLITAN AREA CLINIC 94 | Facility: CLINIC | Age: 58
Setting detail: OPHTHALMOLOGY
End: 2023-05-24
Payer: COMMERCIAL

## 2023-05-24 DIAGNOSIS — H43.813: ICD-10-CM

## 2023-05-24 DIAGNOSIS — H43.812: ICD-10-CM

## 2023-05-24 PROBLEM — H43.811 POSTERIOR VITREOUS DETACHMENT; RIGHT EYE, LEFT EYE, BOTH EYES: Status: ACTIVE | Noted: 2023-05-24

## 2023-05-24 PROCEDURE — 92134 CPTRZ OPH DX IMG PST SGM RTA: CPT | Performed by: OPHTHALMOLOGY

## 2023-05-24 PROCEDURE — 99024 POSTOP FOLLOW-UP VISIT: CPT | Performed by: OPHTHALMOLOGY

## 2023-05-24 ASSESSMENT — TONOMETRY
OD_IOP_MMHG: 18
OS_IOP_MMHG: 18

## 2023-05-24 ASSESSMENT — VISUAL ACUITY
OD_BCVA: 20/20
OS_BCVA: 20/20

## 2023-05-24 ASSESSMENT — CONFRONTATIONAL VISUAL FIELD TEST (CVF)
OS_FINDINGS: FULL
OD_FINDINGS: FULL

## 2023-05-24 ASSESSMENT — REFRACTION_AUTOREFRACTION
OD_CYLINDER: -0.75
OS_AXIS: 021
OS_CYLINDER: -0.50
OD_SPHERE: +0.25
OD_AXIS: 003
OS_SPHERE: -0.75

## 2023-05-24 ASSESSMENT — KERATOMETRY
OD_K2POWER_DIOPTERS: 45.00
OD_K1POWER_DIOPTERS: 44.00
OD_AXISANGLE_DEGREES: 094
OS_AXISANGLE_DEGREES: 092
OS_K2POWER_DIOPTERS: 44.50
OS_K1POWER_DIOPTERS: 44.25

## 2023-05-24 ASSESSMENT — SPHEQUIV_DERIVED
OS_SPHEQUIV: -1
OD_SPHEQUIV: -0.125

## 2023-05-24 ASSESSMENT — AXIALLENGTH_DERIVED
OD_AL: 23.2775
OS_AL: 23.6603

## 2023-06-08 NOTE — ASU PREOP CHECKLIST - VERIFY SURGICAL SITE/SIDE WITH PATIENT
arthroplasty space was well maintained. The capsule was imbricated and closed with 3-0 PDS. Wound was irrigated, tourniquet released and hemostasis was obtained with bipolar cautery. The wound was closed in layers with 4-0 vicryl, 3-0 prolene and thumb splint applied. Disposition: To PACU with no complications and follow up per routine. Patient is instructed to keep splint on and avoid lifting with the left hand.        Jaydon Renteria MD    06/08/23  11:46 AM
done

## 2023-07-21 NOTE — ASU PREOP CHECKLIST - NS PREOP CHK HIBICLENS NA
Detail Level: Detailed Quality 130: Documentation Of Current Medications In The Medical Record: Current Medications Documented Quality 226: Preventive Care And Screening: Tobacco Use: Screening And Cessation Intervention: Patient screened for tobacco use and is an ex/non-smoker N/A Quality 486: Dermatitis - Improvement In Patient-Reported Itch Severity: Itch severity assessment score is reduced by 2 or more points from the initial (index) assessment score to the follow-up visit score

## 2023-07-25 RX ORDER — SODIUM FLUORIDE 5 MG/G
1.1 CREAM DENTAL
Qty: 51 | Refills: 11 | Status: ACTIVE | COMMUNITY
Start: 2021-10-14 | End: 1900-01-01

## 2023-09-19 ENCOUNTER — NON-APPOINTMENT (OUTPATIENT)
Age: 58
End: 2023-09-19

## 2023-09-20 ENCOUNTER — APPOINTMENT (OUTPATIENT)
Dept: OTOLARYNGOLOGY | Facility: CLINIC | Age: 58
End: 2023-09-20
Payer: COMMERCIAL

## 2023-09-20 VITALS
HEART RATE: 75 BPM | WEIGHT: 180 LBS | SYSTOLIC BLOOD PRESSURE: 148 MMHG | BODY MASS INDEX: 27.28 KG/M2 | HEIGHT: 68 IN | DIASTOLIC BLOOD PRESSURE: 97 MMHG | OXYGEN SATURATION: 98 %

## 2023-09-20 DIAGNOSIS — R13.12 DYSPHAGIA, OROPHARYNGEAL PHASE: ICD-10-CM

## 2023-09-20 DIAGNOSIS — C01 MALIGNANT NEOPLASM OF BASE OF TONGUE: ICD-10-CM

## 2023-09-20 PROCEDURE — 31575 DIAGNOSTIC LARYNGOSCOPY: CPT

## 2023-09-20 PROCEDURE — 99214 OFFICE O/P EST MOD 30 MIN: CPT | Mod: 25

## 2023-09-20 RX ORDER — SALIVA SUBSTITUTE COMB NO.11 351 MG
POWDER IN PACKET (EA) MUCOUS MEMBRANE
Qty: 300 | Refills: 0 | Status: DISCONTINUED | COMMUNITY
Start: 2017-07-03 | End: 2023-09-20

## 2023-09-20 RX ORDER — OXYBUTYNIN CHLORIDE 5 MG/1
5 TABLET ORAL
Refills: 0 | Status: DISCONTINUED | COMMUNITY
End: 2023-09-20

## 2023-09-20 RX ORDER — METFORMIN HYDROCHLORIDE 625 MG/1
TABLET ORAL
Refills: 0 | Status: ACTIVE | COMMUNITY

## 2023-09-20 RX ORDER — LISINOPRIL 10 MG/1
10 TABLET ORAL
Refills: 0 | Status: DISCONTINUED | COMMUNITY
End: 2023-09-20

## 2023-09-20 RX ORDER — LACTULOSE 10 G/15ML
10 SOLUTION ORAL; RECTAL
Qty: 300 | Refills: 0 | Status: DISCONTINUED | COMMUNITY
Start: 2017-06-29 | End: 2023-09-20

## 2023-09-20 RX ORDER — SCOPOLAMINE 1 MG/3D
1.5 PATCH, EXTENDED RELEASE TRANSDERMAL
Refills: 0 | Status: DISCONTINUED | COMMUNITY
End: 2023-09-20

## 2023-09-20 RX ORDER — GABAPENTIN 100 MG/1
100 CAPSULE ORAL
Refills: 0 | Status: DISCONTINUED | COMMUNITY
End: 2023-09-20

## 2023-09-27 ENCOUNTER — OFFICE (OUTPATIENT)
Dept: URBAN - METROPOLITAN AREA CLINIC 94 | Facility: CLINIC | Age: 58
Setting detail: OPHTHALMOLOGY
End: 2023-09-27
Payer: COMMERCIAL

## 2023-09-27 DIAGNOSIS — H43.813: ICD-10-CM

## 2023-09-27 DIAGNOSIS — H43.391: ICD-10-CM

## 2023-09-27 PROCEDURE — 92134 CPTRZ OPH DX IMG PST SGM RTA: CPT | Performed by: OPHTHALMOLOGY

## 2023-09-27 PROCEDURE — 92014 COMPRE OPH EXAM EST PT 1/>: CPT | Performed by: OPHTHALMOLOGY

## 2023-09-27 ASSESSMENT — SPHEQUIV_DERIVED
OS_SPHEQUIV: -1
OD_SPHEQUIV: -0.125

## 2023-09-27 ASSESSMENT — KERATOMETRY
OD_AXISANGLE_DEGREES: 094
OD_K2POWER_DIOPTERS: 45.00
OD_K1POWER_DIOPTERS: 44.00
OS_K1POWER_DIOPTERS: 44.25
OS_AXISANGLE_DEGREES: 092
OS_K2POWER_DIOPTERS: 44.50

## 2023-09-27 ASSESSMENT — AXIALLENGTH_DERIVED
OD_AL: 23.2775
OS_AL: 23.6603

## 2023-09-27 ASSESSMENT — CONFRONTATIONAL VISUAL FIELD TEST (CVF)
OS_FINDINGS: FULL
OD_FINDINGS: FULL

## 2023-09-27 ASSESSMENT — REFRACTION_AUTOREFRACTION
OD_CYLINDER: -0.75
OS_CYLINDER: -0.50
OS_SPHERE: -0.75
OS_AXIS: 021
OD_SPHERE: +0.25
OD_AXIS: 003

## 2023-09-27 ASSESSMENT — TONOMETRY
OS_IOP_MMHG: 18
OD_IOP_MMHG: 18

## 2023-09-27 ASSESSMENT — VISUAL ACUITY
OD_BCVA: 20/25
OS_BCVA: 20/20

## 2023-10-20 ENCOUNTER — ASC (OUTPATIENT)
Dept: URBAN - METROPOLITAN AREA SURGERY 8 | Facility: SURGERY | Age: 58
Setting detail: OPHTHALMOLOGY
End: 2023-10-20
Payer: COMMERCIAL

## 2023-10-20 DIAGNOSIS — H43.391: ICD-10-CM

## 2023-10-20 PROCEDURE — 67036 REMOVAL OF INNER EYE FLUID: CPT | Mod: RT | Performed by: OPHTHALMOLOGY

## 2023-10-24 ENCOUNTER — OFFICE (OUTPATIENT)
Dept: URBAN - METROPOLITAN AREA CLINIC 94 | Facility: CLINIC | Age: 58
Setting detail: OPHTHALMOLOGY
End: 2023-10-24
Payer: COMMERCIAL

## 2023-10-24 DIAGNOSIS — H43.811: ICD-10-CM

## 2023-10-24 PROCEDURE — 92134 CPTRZ OPH DX IMG PST SGM RTA: CPT | Performed by: OPHTHALMOLOGY

## 2023-10-24 PROCEDURE — 99024 POSTOP FOLLOW-UP VISIT: CPT | Performed by: OPHTHALMOLOGY

## 2023-10-24 ASSESSMENT — REFRACTION_AUTOREFRACTION
OD_CYLINDER: -0.75
OD_SPHERE: +0.25
OD_AXIS: 003
OS_SPHERE: -0.75
OS_AXIS: 021
OS_CYLINDER: -0.50

## 2023-10-24 ASSESSMENT — AXIALLENGTH_DERIVED
OS_AL: 23.6603
OD_AL: 23.2775

## 2023-10-24 ASSESSMENT — SPHEQUIV_DERIVED
OS_SPHEQUIV: -1
OD_SPHEQUIV: -0.125

## 2023-10-24 ASSESSMENT — KERATOMETRY
OD_K2POWER_DIOPTERS: 45.00
OS_K1POWER_DIOPTERS: 44.25
OS_K2POWER_DIOPTERS: 44.50
OS_AXISANGLE_DEGREES: 092
OD_K1POWER_DIOPTERS: 44.00
OD_AXISANGLE_DEGREES: 094

## 2023-10-24 ASSESSMENT — CONFRONTATIONAL VISUAL FIELD TEST (CVF)
OD_FINDINGS: FULL
OS_FINDINGS: FULL

## 2023-10-24 ASSESSMENT — TONOMETRY
OD_IOP_MMHG: 15
OS_IOP_MMHG: 17

## 2023-10-24 ASSESSMENT — VISUAL ACUITY
OS_BCVA: 20/20
OD_BCVA: 20/20-1

## 2023-11-14 ENCOUNTER — OFFICE (OUTPATIENT)
Dept: URBAN - METROPOLITAN AREA CLINIC 94 | Facility: CLINIC | Age: 58
Setting detail: OPHTHALMOLOGY
End: 2023-11-14
Payer: COMMERCIAL

## 2023-11-14 DIAGNOSIS — H43.813: ICD-10-CM

## 2023-11-14 PROCEDURE — 99024 POSTOP FOLLOW-UP VISIT: CPT | Performed by: OPHTHALMOLOGY

## 2023-11-14 ASSESSMENT — CONFRONTATIONAL VISUAL FIELD TEST (CVF)
OD_FINDINGS: FULL
OS_FINDINGS: FULL

## 2023-11-14 ASSESSMENT — REFRACTION_AUTOREFRACTION
OS_CYLINDER: -0.50
OS_AXIS: 021
OS_SPHERE: -0.75
OD_SPHERE: +0.25
OD_AXIS: 003
OD_CYLINDER: -0.75

## 2023-11-14 ASSESSMENT — SPHEQUIV_DERIVED
OS_SPHEQUIV: -1
OD_SPHEQUIV: -0.125

## 2024-06-05 ENCOUNTER — NON-APPOINTMENT (OUTPATIENT)
Age: 59
End: 2024-06-05

## 2024-09-18 ENCOUNTER — APPOINTMENT (OUTPATIENT)
Dept: OTOLARYNGOLOGY | Facility: CLINIC | Age: 59
End: 2024-09-18

## 2024-10-10 ENCOUNTER — NON-APPOINTMENT (OUTPATIENT)
Age: 59
End: 2024-10-10

## 2024-10-15 ENCOUNTER — NON-APPOINTMENT (OUTPATIENT)
Age: 59
End: 2024-10-15

## 2024-10-28 ENCOUNTER — NON-APPOINTMENT (OUTPATIENT)
Age: 59
End: 2024-10-28

## 2024-12-10 ENCOUNTER — APPOINTMENT (OUTPATIENT)
Dept: OTOLARYNGOLOGY | Facility: CLINIC | Age: 59
End: 2024-12-10
Payer: COMMERCIAL

## 2024-12-10 VITALS
DIASTOLIC BLOOD PRESSURE: 79 MMHG | HEART RATE: 80 BPM | WEIGHT: 190 LBS | OXYGEN SATURATION: 97 % | HEIGHT: 68 IN | SYSTOLIC BLOOD PRESSURE: 113 MMHG | BODY MASS INDEX: 28.79 KG/M2

## 2024-12-10 DIAGNOSIS — C01 MALIGNANT NEOPLASM OF BASE OF TONGUE: ICD-10-CM

## 2024-12-10 DIAGNOSIS — J31.2 CHRONIC PHARYNGITIS: ICD-10-CM

## 2024-12-10 PROCEDURE — 99214 OFFICE O/P EST MOD 30 MIN: CPT | Mod: 25

## 2024-12-10 PROCEDURE — 31575 DIAGNOSTIC LARYNGOSCOPY: CPT

## 2024-12-13 ENCOUNTER — APPOINTMENT (OUTPATIENT)
Dept: PHYSICAL MEDICINE AND REHAB | Facility: CLINIC | Age: 59
End: 2024-12-13
Payer: COMMERCIAL

## 2024-12-13 DIAGNOSIS — Z91.89 OTHER SPECIFIED PERSONAL RISK FACTORS, NOT ELSEWHERE CLASSIFIED: ICD-10-CM

## 2024-12-13 DIAGNOSIS — M54.2 CERVICALGIA: ICD-10-CM

## 2024-12-13 DIAGNOSIS — R13.12 DYSPHAGIA, OROPHARYNGEAL PHASE: ICD-10-CM

## 2024-12-13 PROCEDURE — 99204 OFFICE O/P NEW MOD 45 MIN: CPT

## 2025-01-02 ENCOUNTER — NON-APPOINTMENT (OUTPATIENT)
Age: 60
End: 2025-01-02

## 2025-04-10 ENCOUNTER — APPOINTMENT (OUTPATIENT)
Dept: PHYSICAL MEDICINE AND REHAB | Facility: CLINIC | Age: 60
End: 2025-04-10
Payer: COMMERCIAL

## 2025-04-10 VITALS
DIASTOLIC BLOOD PRESSURE: 75 MMHG | WEIGHT: 195 LBS | SYSTOLIC BLOOD PRESSURE: 109 MMHG | BODY MASS INDEX: 29.55 KG/M2 | HEART RATE: 84 BPM | RESPIRATION RATE: 14 BRPM | HEIGHT: 68 IN

## 2025-04-10 DIAGNOSIS — R13.12 DYSPHAGIA, OROPHARYNGEAL PHASE: ICD-10-CM

## 2025-04-10 DIAGNOSIS — M54.2 CERVICALGIA: ICD-10-CM

## 2025-04-10 DIAGNOSIS — Z91.89 OTHER SPECIFIED PERSONAL RISK FACTORS, NOT ELSEWHERE CLASSIFIED: ICD-10-CM

## 2025-04-10 PROCEDURE — 99213 OFFICE O/P EST LOW 20 MIN: CPT

## 2025-08-12 ENCOUNTER — APPOINTMENT (OUTPATIENT)
Dept: PHYSICAL MEDICINE AND REHAB | Facility: CLINIC | Age: 60
End: 2025-08-12
Payer: COMMERCIAL

## 2025-08-12 VITALS
RESPIRATION RATE: 14 BRPM | DIASTOLIC BLOOD PRESSURE: 72 MMHG | WEIGHT: 166 LBS | HEIGHT: 68 IN | SYSTOLIC BLOOD PRESSURE: 108 MMHG | HEART RATE: 69 BPM | BODY MASS INDEX: 25.16 KG/M2

## 2025-08-12 DIAGNOSIS — Z91.89 OTHER SPECIFIED PERSONAL RISK FACTORS, NOT ELSEWHERE CLASSIFIED: ICD-10-CM

## 2025-08-12 DIAGNOSIS — M54.2 CERVICALGIA: ICD-10-CM

## 2025-08-12 PROCEDURE — 99213 OFFICE O/P EST LOW 20 MIN: CPT
